# Patient Record
Sex: FEMALE | Race: WHITE
[De-identification: names, ages, dates, MRNs, and addresses within clinical notes are randomized per-mention and may not be internally consistent; named-entity substitution may affect disease eponyms.]

---

## 2019-09-12 PROBLEM — Z00.00 ENCOUNTER FOR PREVENTIVE HEALTH EXAMINATION: Status: ACTIVE | Noted: 2019-09-12

## 2019-09-13 ENCOUNTER — LABORATORY RESULT (OUTPATIENT)
Age: 49
End: 2019-09-13

## 2019-09-13 ENCOUNTER — APPOINTMENT (OUTPATIENT)
Dept: GYNECOLOGIC ONCOLOGY | Facility: CLINIC | Age: 49
End: 2019-09-13
Payer: COMMERCIAL

## 2019-09-13 DIAGNOSIS — N83.209 UNSPECIFIED OVARIAN CYST, UNSPECIFIED SIDE: ICD-10-CM

## 2019-09-13 DIAGNOSIS — Z80.41 FAMILY HISTORY OF MALIGNANT NEOPLASM OF OVARY: ICD-10-CM

## 2019-09-13 DIAGNOSIS — Z80.1 FAMILY HISTORY OF MALIGNANT NEOPLASM OF TRACHEA, BRONCHUS AND LUNG: ICD-10-CM

## 2019-09-13 DIAGNOSIS — Z80.49 FAMILY HISTORY OF MALIGNANT NEOPLASM OF OTHER GENITAL ORGANS: ICD-10-CM

## 2019-09-13 DIAGNOSIS — Z80.3 FAMILY HISTORY OF MALIGNANT NEOPLASM OF BREAST: ICD-10-CM

## 2019-09-13 DIAGNOSIS — R10.2 PELVIC AND PERINEAL PAIN: ICD-10-CM

## 2019-09-13 DIAGNOSIS — Z83.3 FAMILY HISTORY OF DIABETES MELLITUS: ICD-10-CM

## 2019-09-13 DIAGNOSIS — Z86.018 PERSONAL HISTORY OF OTHER BENIGN NEOPLASM: ICD-10-CM

## 2019-09-13 DIAGNOSIS — Z78.9 OTHER SPECIFIED HEALTH STATUS: ICD-10-CM

## 2019-09-13 DIAGNOSIS — Z87.410 PERSONAL HISTORY OF CERVICAL DYSPLASIA: ICD-10-CM

## 2019-09-13 PROCEDURE — 76830 TRANSVAGINAL US NON-OB: CPT | Mod: 59

## 2019-09-13 PROCEDURE — 99205 OFFICE O/P NEW HI 60 MIN: CPT | Mod: 25

## 2019-09-13 PROCEDURE — 76857 US EXAM PELVIC LIMITED: CPT | Mod: 59

## 2019-09-13 RX ORDER — CETIRIZINE HCL 10 MG
10 TABLET ORAL
Refills: 0 | Status: ACTIVE | COMMUNITY

## 2019-09-13 NOTE — CHIEF COMPLAINT
[FreeTextEntry1] : [facility]\par \par [facility] [phone] at [address] [place] [id number]\par

## 2019-09-13 NOTE — ASSESSMENT
[FreeTextEntry1] : Discussed with patient the importance of having genetic testing done prior to any planned surgery. We discussed my recommendation of robotic hysterectomy, BSO. Patient is quite eager to proceed with surgery. She will return to the office in December to sign consent for planned surgery in January 2020.

## 2019-09-13 NOTE — HISTORY OF PRESENT ILLNESS
[FreeTextEntry1] : This 50yo , c/s x 1, LMP 18 with history of laparotomy for dermoid in , laparoscopy in  with ROBERT has a family history of breast, uterine and ovarian cancer in her mother. Patient had prophylactic mastectomy 6 years ago. Has not had genetic testing and reports that her mother had negative genetic testing. Pt complains of right pelvic pain near her pubic bone which began a few weeks ago, described as a "weird sensation" affecting the way she walked when on her feet for long periods of time. She is eager to have risk reducing surgery for fear of developing a gynecological malignancy. Patient is overdue for her pap smear. Reports normal and regular menses.  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 \par \par Pap smear-2017-normal, h/o abnormal paps and LEEP\par Mammogram-6 years ago\par Colonoscopy-never\par

## 2019-09-13 NOTE — PHYSICAL EXAM
[Normal] : [employer]am[employer]: Normal [de-identified] : Patient was interviewed and examined with chaperone present. Name of chaperone: Rosie Tong

## 2019-09-13 NOTE — END OF VISIT
[FreeTextEntry3] : Written by Rosie SUTTON, acting as a scribe for Dr. Brian Chandler.\par This note accurately reflects the work and decisions made by me.\par

## 2019-09-17 ENCOUNTER — RESULT REVIEW (OUTPATIENT)
Age: 49
End: 2019-09-17

## 2019-09-17 ENCOUNTER — TRANSCRIPTION ENCOUNTER (OUTPATIENT)
Age: 49
End: 2019-09-17

## 2019-09-17 LAB — HPV HIGH+LOW RISK DNA PNL CVX: NOT DETECTED

## 2019-09-20 LAB — CYTOLOGY CVX/VAG DOC THIN PREP: NORMAL

## 2020-04-08 ENCOUNTER — APPOINTMENT (OUTPATIENT)
Dept: GYNECOLOGIC ONCOLOGY | Facility: CLINIC | Age: 50
End: 2020-04-08
Payer: COMMERCIAL

## 2020-04-08 DIAGNOSIS — R19.00 INTRA-ABDOMINAL AND PELVIC SWELLING, MASS AND LUMP, UNSPECIFIED SITE: ICD-10-CM

## 2020-04-08 PROCEDURE — 76830 TRANSVAGINAL US NON-OB: CPT | Mod: 59

## 2020-04-08 PROCEDURE — 76857 US EXAM PELVIC LIMITED: CPT | Mod: 59

## 2020-04-08 PROCEDURE — 99214 OFFICE O/P EST MOD 30 MIN: CPT | Mod: 25

## 2020-04-08 NOTE — REASON FOR VISIT
[FreeTextEntry1] : [facility]\par \par [facility] [phone] at [address] [place] [id number]\par

## 2020-04-08 NOTE — HISTORY OF PRESENT ILLNESS
[FreeTextEntry1] : 48 y/o with a history of laparotomy for dermoid in 1993, laparoscopy in 1998 with ROBERT,family history of breast, uterine and ovarian cancer in her mother presents today for evaluation of large pelvic mass. Patient reports mothers genetic testing was negative. Her personal genetic results from 09/2019 were negative. She was recently seen in [facility] and was found to have a large pelvic mass. She reports yesterday morning she was doubled over in pain. She had a CT abd/pelvis 04/07/20 that revealed no acute intra-abdominal abnormality, probable fibroid uterus.  I last saw the patient in 09/2019 for a consultation and recommended a robotic hysterectomy. Patient wanted to return in 12/2019 to sign consent, which she failed to do. Cervical pap from 09/2019 was negative, HPV not detected. Patient reports today she feels fine. She denies pelvic pain.

## 2020-04-08 NOTE — ASSESSMENT
[FreeTextEntry1] : Ultrasound showed a pedunculated fibroid right posterior 5 cm. Endometrium 11 mm. Right and left ovary WNL\par \par I discussed with patient her US today shows a 5 cm right posterior fibroid. The way she described her symptoms she was feeling yesterday my suspicion is that her findings represent an adnexal fibroma that may have intermittently torsed causing her pain. She is asymptomatic today. Her pelvic exam is benign, non tender, and mass is not palpable. I would like for her to have a MRI pelvis and NELLY blood test to further evaluate. Patient understands that due to COVID-19 crisis there are limitations and strict guidelines for patients to have a surgical procedure. I would like to scheduled a TeleHealth visit next week to review these results with her. If there is a torsion then we will discuss urgent surgical intervention. If her findings are benign she understands that surgical intervention will not be an option at this time. \par \par Despite conversation with Dr. Chandler, patient discussed with the  that she was upset with the amount of time he spent with her, and the way recommendation was delivered. She was offered more time to discuss results and recommendation with Dr. Chandler but she refused. Patient states "she just wants to leave the office".

## 2020-04-08 NOTE — END OF VISIT
[FreeTextEntry3] : Written by Hanna Knight, acting as a scribe for Dr. Brian Chandler.\par This note accurately reflects the work and decisions made by me\par

## 2020-04-15 ENCOUNTER — APPOINTMENT (OUTPATIENT)
Dept: GYNECOLOGIC ONCOLOGY | Facility: CLINIC | Age: 50
End: 2020-04-15